# Patient Record
Sex: FEMALE | Race: BLACK OR AFRICAN AMERICAN | NOT HISPANIC OR LATINO | Employment: STUDENT | URBAN - METROPOLITAN AREA
[De-identification: names, ages, dates, MRNs, and addresses within clinical notes are randomized per-mention and may not be internally consistent; named-entity substitution may affect disease eponyms.]

---
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
## 2023-12-21 ENCOUNTER — HOSPITAL ENCOUNTER (EMERGENCY)
Facility: HOSPITAL | Age: 18
Discharge: HOME | End: 2023-12-22
Attending: STUDENT IN AN ORGANIZED HEALTH CARE EDUCATION/TRAINING PROGRAM
Payer: COMMERCIAL

## 2023-12-21 VITALS
OXYGEN SATURATION: 99 % | WEIGHT: 97 LBS | RESPIRATION RATE: 18 BRPM | HEIGHT: 64 IN | TEMPERATURE: 98.4 F | SYSTOLIC BLOOD PRESSURE: 104 MMHG | BODY MASS INDEX: 16.56 KG/M2 | HEART RATE: 81 BPM | DIASTOLIC BLOOD PRESSURE: 58 MMHG

## 2023-12-21 DIAGNOSIS — K59.00 CONSTIPATION, UNSPECIFIED CONSTIPATION TYPE: ICD-10-CM

## 2023-12-21 DIAGNOSIS — N39.0 URINARY TRACT INFECTION WITH HEMATURIA, SITE UNSPECIFIED: Primary | ICD-10-CM

## 2023-12-21 DIAGNOSIS — R31.9 URINARY TRACT INFECTION WITH HEMATURIA, SITE UNSPECIFIED: Primary | ICD-10-CM

## 2023-12-21 LAB
B-HCG UR QL: NEGATIVE
BILIRUB UR QL STRIP: NEGATIVE
CLARITY UR: ABNORMAL
COLOR UR: YELLOW
GLUCOSE UR STRIP-MCNC: NEGATIVE MG/DL
HGB UR QL STRIP.AUTO: ABNORMAL
KETONES UR QL STRIP: NEGATIVE
LEUKOCYTE ESTERASE UR QL STRIP.AUTO: ABNORMAL
NITRITE UR QL STRIP: NEGATIVE
PH UR STRIP.AUTO: 6 [PH] (ref 5–8)
PROT UR QL STRIP: ABNORMAL
SP GR UR STRIP: 1.02 (ref 1–1.03)
UROBILINOGEN UR QL STRIP: ABNORMAL

## 2023-12-21 PROCEDURE — 81001 URINALYSIS AUTO W/SCOPE: CPT | Performed by: STUDENT IN AN ORGANIZED HEALTH CARE EDUCATION/TRAINING PROGRAM

## 2023-12-21 PROCEDURE — 99283 EMERGENCY DEPT VISIT LOW MDM: CPT

## 2023-12-21 PROCEDURE — 81025 URINE PREGNANCY TEST: CPT

## 2023-12-21 PROCEDURE — 87086 URINE CULTURE/COLONY COUNT: CPT | Performed by: STUDENT IN AN ORGANIZED HEALTH CARE EDUCATION/TRAINING PROGRAM

## 2023-12-22 LAB
BACTERIA UR QL AUTO: ABNORMAL /HPF
HOLD SPECIMEN: NORMAL
HYALINE CASTS UR QL AUTO: ABNORMAL /LPF
RBC # UR STRIP: ABNORMAL /HPF
REF LAB TEST METHOD: ABNORMAL
SQUAMOUS #/AREA URNS HPF: ABNORMAL /HPF
WBC # UR STRIP: ABNORMAL /HPF

## 2023-12-22 RX ORDER — CEPHALEXIN 500 MG/1
500 CAPSULE ORAL 2 TIMES DAILY
Qty: 10 CAPSULE | Refills: 0 | Status: SHIPPED | OUTPATIENT
Start: 2023-12-22 | End: 2023-12-27

## 2023-12-22 RX ORDER — CEPHALEXIN 500 MG/1
500 CAPSULE ORAL ONCE
Status: COMPLETED | OUTPATIENT
Start: 2023-12-22 | End: 2023-12-22

## 2023-12-22 RX ADMIN — CEPHALEXIN 500 MG: 500 CAPSULE ORAL at 00:57

## 2023-12-22 NOTE — FSED PROVIDER NOTE
EMERGENCY DEPARTMENT ENCOUNTER    Room Number:  12/12  Date seen:  12/22/2023  Time seen: 00:54 EST  PCP: Angela Alvarado MD        HPI:    18-year-old female presents emergency department with complaints of UTI symptoms and constipation.  Patient was seen here 3 days ago and was diagnosed with UTI.  Patient took 1 dose of medication, but then accidentally threw her prescription away.  Patient now having constipation and hematuria.  Concern, she decided come to the ER for evaluation.  Patient not taking medication for symptoms.  Patient denies fever, nausea or back pain.    PAST MEDICAL HISTORY  Active Ambulatory Problems     Diagnosis Date Noted    No Active Ambulatory Problems     Resolved Ambulatory Problems     Diagnosis Date Noted    No Resolved Ambulatory Problems     No Additional Past Medical History         PAST SURGICAL HISTORY  Past Surgical History:   Procedure Laterality Date    NEUROPLASTY      WISDOM TOOTH EXTRACTION           FAMILY HISTORY  Family History   Problem Relation Age of Onset    No Known Problems Other          SOCIAL HISTORY  Social History     Socioeconomic History    Marital status: Single   Tobacco Use    Smoking status: Never    Smokeless tobacco: Never   Vaping Use    Vaping Use: Every day    Substances: Nicotine, Flavoring   Substance and Sexual Activity    Alcohol use: Yes     Comment: 2x weekly     Drug use: Yes     Types: Marijuana    Sexual activity: Yes     Partners: Male     Birth control/protection: None         ALLERGIES  Patient has no known allergies.        REVIEW OF SYSTEMS    All systems reviewed and negative except for those discussed in HPI.       PHYSICAL EXAM  ED Triage Vitals [12/21/23 2341]   Temp Heart Rate Resp BP SpO2   98.4 °F (36.9 °C) 81 18 104/58 99 %      Temp src Heart Rate Source Patient Position BP Location FiO2 (%)   Oral -- Sitting Right arm --       Vital signs and nursing notes reviewed.  GENERAL: Nontoxic.  CV: Normal perfusion.   RESPIRATORY:  No respiratory distress.   ABDOMEN: Soft.         LAB RESULTS  Recent Results (from the past 24 hour(s))   Urinalysis without microscopic (no culture) - Urine, Clean Catch    Collection Time: 12/21/23 11:14 PM    Specimen: Urine, Clean Catch   Result Value Ref Range    Color, UA Yellow Yellow, Straw    Appearance, UA Cloudy (A) Clear    pH, UA 6.0 5.0 - 8.0    Specific Gravity, UA 1.025 1.005 - 1.030    Glucose, UA Negative Negative    Ketones, UA Negative Negative    Bilirubin, UA Negative Negative    Blood, UA Large (3+) (A) Negative    Protein, UA 30 mg/dL (1+) (A) Negative    Leuk Esterase, UA Small (1+) (A) Negative    Nitrite, UA Negative Negative    Urobilinogen, UA 0.2 E.U./dL 0.2 - 1.0 E.U./dL   Pregnancy, Urine - Urine, Clean Catch    Collection Time: 12/21/23 11:14 PM    Specimen: Urine, Clean Catch   Result Value Ref Range    HCG, Urine QL Negative Negative       Ordered the above labs and reviewed the results.        RADIOLOGY  No Radiology Exams Resulted Within Past 24 Hours    I ordered the above noted radiological studies. Reviewed by me and discussed with radiologist.  See dictation for official radiology interpretation.        PROCEDURES  Procedures      MEDICATIONS GIVEN IN ER  Medications   cephalexin (KEFLEX) capsule 500 mg (has no administration in time range)         MEDICATIONS GIVEN IN ER    Medications   cephalexin (KEFLEX) capsule 500 mg (has no administration in time range)         PROGRESS, DATA ANALYSIS, CONSULTS, AND MEDICAL DECISION MAKING    All labs have been independently reviewed by me.  All radiology studies have been reviewed by me and discussed with radiologist dictating the report.   EKG's independently viewed and interpreted by me.  Discussion below represents my analysis of pertinent findings related to patient's condition, differential diagnosis, treatment plan and final disposition.           AS OF 00:54 EST VITALS:    BP - 104/58  HR - 81  TEMP - 98.4 °F (36.9 °C)  (Oral)  02 SATS - 99%      MDM    Patient being evaluated for UTI symptoms and constipation.  Initial vital signs stable.  Exam unremarkable.    DDx: UTI, pyelonephritis, constipation, doubt bowel obstruction.    Considered plain film of the abdomen pelvis, but likely to be nondiagnostic, patient has no history of abdominal surgeries, doubt bowel obstruction.    Patient now having blood in her urine, patient's last menstrual period 2 weeks ago.  Could be worsening urinary tract infection.  Patient given prescription for Keflex, first dose given here in the emergency department.    Considered providing prescription for stool softeners and MiraLAX, but patient declined, patient would like to pick this up over-the-counter.    Social determinants of care: None    Shared decision making: Patient is to follow-up PCP.    DIAGNOSIS  Final diagnoses:   Urinary tract infection with hematuria, site unspecified   Constipation, unspecified constipation type         DISPOSITION  Home

## 2023-12-23 LAB — BACTERIA SPEC AEROBE CULT: NO GROWTH
